# Patient Record
Sex: FEMALE | Race: WHITE | Employment: UNEMPLOYED | URBAN - METROPOLITAN AREA
[De-identification: names, ages, dates, MRNs, and addresses within clinical notes are randomized per-mention and may not be internally consistent; named-entity substitution may affect disease eponyms.]

---

## 2017-07-20 ENCOUNTER — OFFICE VISIT (OUTPATIENT)
Dept: FAMILY MEDICINE CLINIC | Facility: CLINIC | Age: 47
End: 2017-07-20

## 2017-07-20 VITALS
WEIGHT: 180 LBS | DIASTOLIC BLOOD PRESSURE: 70 MMHG | OXYGEN SATURATION: 99 % | HEART RATE: 88 BPM | BODY MASS INDEX: 29.99 KG/M2 | HEIGHT: 65 IN | SYSTOLIC BLOOD PRESSURE: 102 MMHG | TEMPERATURE: 98 F

## 2017-07-20 DIAGNOSIS — J40 BRONCHITIS: Primary | ICD-10-CM

## 2017-07-20 PROCEDURE — 99203 OFFICE O/P NEW LOW 30 MIN: CPT | Performed by: NURSE PRACTITIONER

## 2017-07-20 RX ORDER — PREDNISONE 20 MG/1
TABLET ORAL
Qty: 15 TABLET | Refills: 0 | Status: SHIPPED | OUTPATIENT
Start: 2017-07-20 | End: 2018-05-22

## 2017-07-20 RX ORDER — ALBUTEROL SULFATE 90 UG/1
AEROSOL, METERED RESPIRATORY (INHALATION)
Qty: 1 INHALER | Refills: 0 | Status: SHIPPED | OUTPATIENT
Start: 2017-07-20 | End: 2018-05-22

## 2017-07-20 RX ORDER — CODEINE PHOSPHATE AND GUAIFENESIN 10; 100 MG/5ML; MG/5ML
5 SOLUTION ORAL 4 TIMES DAILY PRN
Qty: 120 ML | Refills: 0 | Status: SHIPPED | OUTPATIENT
Start: 2017-07-20 | End: 2018-05-22

## 2017-07-20 NOTE — PROGRESS NOTES
CHIEF COMPLAINT:   Patient presents with:  Cough        HPI:   Antoinette Amin is a 42year old female who presents for cough for  3  days. Cough started gradually and is described as dry and hacking. Patient + history of bronchitis.  Cough is nonproduct other skin lesions. EYES: Denies blurred vision or double vision. HENT: Denies ear pain, decreased hearing, or sore throat. Reports mild sinus congestion. CARDIOVASCULAR: Denies chest pain or palpitations  LUNGS: Per HPI.    GI: Denies N/V/C/D or abdom indicates understanding of these issues and agrees to the plan. The patient is asked to return if sx's persist or worsen.

## 2018-05-22 ENCOUNTER — OFFICE VISIT (OUTPATIENT)
Dept: FAMILY MEDICINE CLINIC | Facility: CLINIC | Age: 48
End: 2018-05-22

## 2018-05-22 VITALS
HEIGHT: 65 IN | OXYGEN SATURATION: 98 % | BODY MASS INDEX: 29.99 KG/M2 | TEMPERATURE: 98 F | DIASTOLIC BLOOD PRESSURE: 60 MMHG | WEIGHT: 180 LBS | HEART RATE: 75 BPM | RESPIRATION RATE: 18 BRPM | SYSTOLIC BLOOD PRESSURE: 98 MMHG

## 2018-05-22 DIAGNOSIS — J01.40 ACUTE PANSINUSITIS, RECURRENCE NOT SPECIFIED: ICD-10-CM

## 2018-05-22 DIAGNOSIS — R06.2 WHEEZE: ICD-10-CM

## 2018-05-22 DIAGNOSIS — J40 BRONCHITIS: Primary | ICD-10-CM

## 2018-05-22 PROCEDURE — 99213 OFFICE O/P EST LOW 20 MIN: CPT | Performed by: NURSE PRACTITIONER

## 2018-05-22 RX ORDER — PREDNISONE 20 MG/1
20 TABLET ORAL 2 TIMES DAILY
Qty: 10 TABLET | Refills: 0 | Status: SHIPPED | OUTPATIENT
Start: 2018-05-22 | End: 2018-05-27

## 2018-05-22 RX ORDER — DOXYCYCLINE HYCLATE 100 MG
100 TABLET ORAL 2 TIMES DAILY
Qty: 14 TABLET | Refills: 0 | Status: SHIPPED | OUTPATIENT
Start: 2018-05-22 | End: 2018-05-29

## 2018-05-22 RX ORDER — ALBUTEROL SULFATE 2.5 MG/3ML
2.5 SOLUTION RESPIRATORY (INHALATION) EVERY 4 HOURS PRN
Qty: 1 BOX | Refills: 0 | Status: SHIPPED | OUTPATIENT
Start: 2018-05-22 | End: 2018-05-29

## 2018-05-22 RX ORDER — BENZONATATE 200 MG/1
200 CAPSULE ORAL 3 TIMES DAILY PRN
Qty: 20 CAPSULE | Refills: 0 | Status: SHIPPED | OUTPATIENT
Start: 2018-05-22 | End: 2018-05-29

## 2018-05-22 RX ORDER — ALBUTEROL SULFATE 90 UG/1
2 AEROSOL, METERED RESPIRATORY (INHALATION) EVERY 4 HOURS PRN
Qty: 1 INHALER | Refills: 0 | Status: SHIPPED | OUTPATIENT
Start: 2018-05-22 | End: 2018-06-05

## 2018-05-23 NOTE — PATIENT INSTRUCTIONS
Humidifier in room  Sleep propped  Push fluids  Limit dairy  Mucinex as directed  Sudafed as needed  Can do Benadryl at night  GO TO ER FOR DIFFICULTY BREATHING    Viral or Bacterial Bronchitis with Wheezing (Adult)    Bronchitis is an infection of the a · Your appetite may be poor, so a light diet is fine. Avoid dehydration by drinking 6 to 8 glasses of fluids per day (such as water, soft drinks, sports drinks, juices, tea, or soup). Extra fluids will help loosen secretions in the nose and lungs.   · Over- © 6644-2655 [employer] [address] [facility], [facility] [place]. All rights reserved. This information is not intended as a substitute for professional medical care. Always follow your healthcare professional's instructions.         Sinusit · Over-the-counter decongestants may be used unless a similar medicine was prescribed. Nasal sprays work the fastest. Use one that contains phenylephrine or oxymetazoline. First blow the nose gently. Then use the spray.  Do not use these medicines more ofte © 5200-1688 [employer] [address] [facility], [facility] [place]. All rights reserved. This information is not intended as a substitute for professional medical care. Always follow your healthcare professional's instructions.

## 2018-05-23 NOTE — PROGRESS NOTES
CHIEF COMPLAINT:   Patient presents with:  Cough: wet, OTC meds  Chest Congestion: s/s for 1 week        HPI:   Tj Chase is a 41year old female who presents for cough for  1  weeks. Cough started gradually and is described as deep and wet.  Helio • VARICELLA       Social History:  Smoking status: Current Every Day Smoker                                                   Packs/day: 2.00      Years: 0.00      Smokeless tobacco: Never Used                      Alcohol use:  Yes              Comment: oc Albuterol Sulfate  (90 Base) MCG/ACT Inhalation Aero Soln 1 Inhaler 0      Sig: Inhale 2 puffs into the lungs every 4 (four) hours as needed for Wheezing or Shortness of Breath.       benzonatate 200 MG Oral Cap 20 capsule 0      Sig: Take 1 capsule · If symptoms are severe, rest at home for the first 2 to 3 days. When you go back to your usual activities, don't let yourself get too tired. · Do not smoke. Also avoid being exposed to secondhand smoke.   · You may use over-the-counter medicine to [facility] When to seek medical advice  Call your healthcare provider right away if any of these occur:  · Fever of 100.4°F (38°C) or higher, or as directed by your healthcare provider  · Coughing up increasing amounts of colored sputum  · Weakness, drowsiness, heada · An expectorant containing guaifenesin may help thin the mucus and promote drainage from the sinuses. · Over-the-counter decongestants may be used unless a similar medicine was prescribed.  Nasal sprays work the fastest. Use one that contains phenylephrin © 7324-0378 [employer] [address]7 [facility], [facility] [place]. All rights reserved. This information is not intended as a substitute for professional medical care. Always follow your healthcare professional's instructions.

## 2018-11-06 ENCOUNTER — CHARTING TRANS (OUTPATIENT)
Dept: OTHER | Age: 48
End: 2018-11-06

## 2018-12-08 VITALS
HEART RATE: 72 BPM | SYSTOLIC BLOOD PRESSURE: 100 MMHG | HEIGHT: 65 IN | RESPIRATION RATE: 16 BRPM | TEMPERATURE: 98.1 F | DIASTOLIC BLOOD PRESSURE: 70 MMHG

## (undated) NOTE — LETTER
Date: 7/20/2017    Patient Name: Jose Maria Fowler          To Whom it may concern: This letter has been written at the patient's request. The above patient was seen at the [facility] for treatment of a medical condition.     This patient s